# Patient Record
Sex: FEMALE
[De-identification: names, ages, dates, MRNs, and addresses within clinical notes are randomized per-mention and may not be internally consistent; named-entity substitution may affect disease eponyms.]

---

## 2019-01-10 ENCOUNTER — HOSPITAL ENCOUNTER (EMERGENCY)
Dept: HOSPITAL 42 - ED | Age: 34
Discharge: HOME | End: 2019-01-10
Payer: MEDICAID

## 2019-01-10 VITALS
OXYGEN SATURATION: 100 % | SYSTOLIC BLOOD PRESSURE: 124 MMHG | DIASTOLIC BLOOD PRESSURE: 61 MMHG | HEART RATE: 64 BPM | TEMPERATURE: 98.6 F

## 2019-01-10 VITALS — BODY MASS INDEX: 27.4 KG/M2

## 2019-01-10 VITALS — RESPIRATION RATE: 18 BRPM

## 2019-01-10 DIAGNOSIS — N30.90: Primary | ICD-10-CM

## 2019-01-10 DIAGNOSIS — R10.31: ICD-10-CM

## 2019-01-10 LAB
ALBUMIN SERPL-MCNC: 4.7 G/DL (ref 3–4.8)
ALBUMIN/GLOB SERPL: 1.4 {RATIO} (ref 1.1–1.8)
ALT SERPL-CCNC: 26 U/L (ref 7–56)
APPEARANCE UR: (no result)
APTT BLD: 28.5 SECONDS (ref 25.1–36.5)
AST SERPL-CCNC: 20 U/L (ref 14–36)
BACTERIA #/AREA URNS HPF: (no result) /HPF
BASOPHILS # BLD AUTO: 0.01 K/MM3 (ref 0–2)
BASOPHILS NFR BLD: 0.1 % (ref 0–3)
BILIRUB UR-MCNC: NEGATIVE MG/DL
BUN SERPL-MCNC: 12 MG/DL (ref 7–21)
CALCIUM SERPL-MCNC: 10.2 MG/DL (ref 8.4–10.5)
COLOR UR: YELLOW
EOSINOPHIL # BLD: 0 10*3/UL (ref 0–0.7)
EOSINOPHIL NFR BLD: 0 % (ref 1.5–5)
ERYTHROCYTE [DISTWIDTH] IN BLOOD BY AUTOMATED COUNT: 13.7 % (ref 11.5–14.5)
GFR NON-AFRICAN AMERICAN: > 60
GLUCOSE UR STRIP-MCNC: NEGATIVE MG/DL
GRANULOCYTES # BLD: 12.66 10*3/UL (ref 1.4–6.5)
GRANULOCYTES NFR BLD: 85.3 % (ref 50–68)
HGB BLD-MCNC: 14.5 G/DL (ref 12–16)
INR PPP: 1.05
LEUKOCYTE ESTERASE UR-ACNC: (no result) LEU/UL
LIPASE SERPL-CCNC: 43 U/L (ref 23–300)
LYMPHOCYTES # BLD: 1.6 10*3/UL (ref 1.2–3.4)
LYMPHOCYTES NFR BLD AUTO: 10.6 % (ref 22–35)
MCH RBC QN AUTO: 28.6 PG (ref 25–35)
MCHC RBC AUTO-ENTMCNC: 33.9 G/DL (ref 31–37)
MCV RBC AUTO: 84.4 FL (ref 80–105)
MONOCYTES # BLD AUTO: 0.6 10*3/UL (ref 0.1–0.6)
MONOCYTES NFR BLD: 4 % (ref 1–6)
PH UR STRIP: 7 [PH] (ref 4.7–8)
PLATELET # BLD: 274 10^3/UL (ref 120–450)
PMV BLD AUTO: 10.5 FL (ref 7–11)
PROT UR STRIP-MCNC: 100 MG/DL
PROTHROMBIN TIME: 12.1 SECONDS (ref 9.4–12.5)
RBC # BLD AUTO: 5.07 10^6/UL (ref 3.5–6.1)
RBC # UR STRIP: (no result) /UL
RBC #/AREA URNS HPF: (no result) /HPF (ref 0–2)
SP GR UR STRIP: >= 1.03 (ref 1–1.03)
UROBILINOGEN UR STRIP-ACNC: 0.2 E.U./DL
WBC # BLD AUTO: 14.8 10^3/UL (ref 4.5–11)
WBC #/AREA URNS HPF: (no result) /HPF (ref 0–6)

## 2019-01-10 PROCEDURE — 99283 EMERGENCY DEPT VISIT LOW MDM: CPT

## 2019-01-10 PROCEDURE — 80053 COMPREHEN METABOLIC PANEL: CPT

## 2019-01-10 PROCEDURE — 96365 THER/PROPH/DIAG IV INF INIT: CPT

## 2019-01-10 PROCEDURE — 85610 PROTHROMBIN TIME: CPT

## 2019-01-10 PROCEDURE — 83735 ASSAY OF MAGNESIUM: CPT

## 2019-01-10 PROCEDURE — 87086 URINE CULTURE/COLONY COUNT: CPT

## 2019-01-10 PROCEDURE — 81001 URINALYSIS AUTO W/SCOPE: CPT

## 2019-01-10 PROCEDURE — 85025 COMPLETE CBC W/AUTO DIFF WBC: CPT

## 2019-01-10 PROCEDURE — 96361 HYDRATE IV INFUSION ADD-ON: CPT

## 2019-01-10 PROCEDURE — 83690 ASSAY OF LIPASE: CPT

## 2019-01-10 PROCEDURE — 81025 URINE PREGNANCY TEST: CPT

## 2019-01-10 PROCEDURE — 87181 SC STD AGAR DILUTION PER AGT: CPT

## 2019-01-10 PROCEDURE — 85730 THROMBOPLASTIN TIME PARTIAL: CPT

## 2019-01-10 PROCEDURE — 96375 TX/PRO/DX INJ NEW DRUG ADDON: CPT

## 2019-01-10 PROCEDURE — 74177 CT ABD & PELVIS W/CONTRAST: CPT

## 2019-01-10 NOTE — CT
Date of service: 



01/10/2019



PROCEDURE:  CT Abdomen and Pelvis with contrast



HISTORY:

RLQ pain



COMPARISON:

None.



TECHNIQUE:

Following the intravenous administration of iodinated contrast 

material, a CT examination of the abdomen and pelvis performed from 

the domes of the diaphragms to the symphysis pubis with reformatted 

datasets provided in axial, sagittal and coronal planes. Oral 

contrast was not administered as per referring physician request. 

Coronal and sagittal reformats were generated.



Contrast dose: Omnipaque 300, 100 cc



Radiation dose:



Total exam DLP = 639.8 mGy-cm.



This CT exam was performed using one or more of the following dose 

reduction techniques: Automated exposure control, adjustment of the 

mA and/or kV according to patient size, and/or use of iterative 

reconstruction technique.



FINDINGS:



LOWER THORAX:

Unremarkable. 



LIVER:

Unremarkable. No gross lesion or ductal dilatation. 



GALLBLADDER AND BILE DUCTS:

Unremarkable. 



PANCREAS:

Unremarkable. No gross lesion or ductal dilatation.



SPLEEN:

Unremarkable. 



ADRENALS:

Unremarkable. No mass. 



KIDNEYS AND URETERS:

No obstructive uropathy is appreciate bilaterally or suspicious solid 

renal mass.  However, there is abnormal enhancement involving the 

wall of the right renal pelvis and the entire right ureter down to 

the ureter vessel junction.  Consider potential expelled calculus 

with reactive changes at the right renal pelvis and right ureter or 

possible minimal obstructing loosing calculus at the right UV 

junction.  Expelled calculus is likely given limited right 

hydroureter.  Alternatively, there may be be right ureteritis on an 

infectious or inflammatory basis and further clinical correlation is 

recommended. 



VASCULATURE:

Unremarkable. No aortic aneurysm. No aortic atherosclerotic 

calcification or mural plaque present.



BOWEL:

Unremarkable. No obstruction. No gross mural thickening. 



APPENDIX:

Normal appendix. 



PERITONEUM:

Unremarkable. No free fluid. No free air. 



LYMPH NODES:

Unremarkable. No enlarged lymph nodes. 



BLADDER:

Distended but otherwise unremarkable. 



REPRODUCTIVE:

1.6 cm right adnexal cyst with trace local adnexal fluid which may 

indicate partial rupture. 



BONES:

No acute fracture. 



OTHER FINDINGS:

None.



IMPRESSION:

1. Limited right hydroureter with abnormal mural enhancement 

throughout the right ureter also involving the right renal pelvis of 

the right renal pelvis is not distended.  No hydronephrosis.  

Consider possible expelled obstructing right ureteral calculus versus 

infectious or inflammatory etiology of mural thickening enhancement 

throughout the right ureter.  Urinary bladder appears unremarkable.  

Left kidney is negative. 



2. Negative appendix.

## 2019-01-10 NOTE — ED PDOC
Arrival/HPI





- General


Chief Complaint: Abdominal Pain


Time Seen by Provider: 01/10/19 12:07


Historian: Patient





- History of Present Illness


Narrative History of Present Illness (Text): 





01/10/19 14:42


34yo female with no pmhx who present with complaint of RLQ abdominal pain since 

last night with associated nausea. Describes pain as sharp and constant. Admits 

to urinary frequency. Denies vomiting, diarrhea, constipation, fever, chills, 

dysuria, hematemesis, melena, hematuria, back pain, sick contact, travel, any 

other complaint.











Past Medical History





- Provider Review


Nursing Documentation Reviewed: Yes





- Psychiatric


Hx Substance Use: No





- Surgical History


Other/Comment: 3 normal birth delivery





Family/Social History





- Physician Review


Nursing Documentation Reviewed: Yes


Family/Social History: Unknown Family HX


Smoking Status: Never Smoked


Hx Alcohol Use: No


Hx Substance Use: No





Allergies/Home Meds


Allergies/Adverse Reactions: 


Allergies





No Known Allergies Allergy (Verified 01/10/19 12:24)


   











Review of Systems





- Physician Review


All systems were reviewed & negative as marked: Yes





- Review of Systems


Constitutional: Normal


Eyes: Normal


ENT: Normal


Respiratory: Normal


Cardiovascular: Normal


Gastrointestinal: Normal


Genitourinary Female: Normal


Musculoskeletal: Normal


Skin: Normal


Neurological: Normal


Endocrine: Normal


Hemo/Lymphatic: Normal


Psychiatric: Normal





Physical Exam


Vital Signs Reviewed: Yes





Vital Signs











  Temp Pulse Resp BP Pulse Ox


 


 01/10/19 12:31  98.7 F  70  18  135/84  99











Temperature: Afebrile


Blood Pressure: Normal


Pulse: Regular


Respiratory Rate: Normal


Appearance: Positive for: Well-Appearing, Non-Toxic, Comfortable


Pain Distress: None


Mental Status: Positive for: Alert and Oriented X 3





- Systems Exam


Head: Present: Atraumatic, Normocephalic


Pupils: Present: PERRL


Extroacular Muscles: Present: EOMI


Conjunctiva: Present: Normal


Mouth: Present: Moist Mucous Membranes


Neck: Present: Normal Range of Motion


Respiratory/Chest: Present: Clear to Auscultation, Good Air Exchange.  No: 

Respiratory Distress, Accessory Muscle Use


Cardiovascular: Present: Regular Rate and Rhythm, Normal S1, S2.  No: Murmurs


Abdomen: Present: Tenderness, Normal Bowel Sounds, Guarding (Voluntary), Other 

(Soft).  No: Distention, Peritoneal Signs, Rebound, McBurney's Point Tender, 

Rovsing's Sign Present


Back: Present: Normal Inspection


Upper Extremity: Present: Normal Inspection.  No: Cyanosis, Edema


Lower Extremity: Present: Normal Inspection.  No: Edema


Neurological: Present: GCS=15, CN II-XII Intact, Speech Normal


Skin: Present: Warm, Dry, Normal Color.  No: Rashes


Psychiatric: Present: Alert, Oriented x 3, Normal Insight, Normal Concentration





Medical Decision Making


ED Course and Treatment: 





01/10/19 19:31


PT presented to ED for stated history. she was afebrile and hemodynamically 

stable.





Labs was reviewed and leukocytosis and cystitis was noted





Abdominal/pelvic Ct was ordered to r/o appendicitis








Abdominal/pelvic CT


IMPRESSION:


1. Limited right hydroureter with abnormal mural enhancement throughout the 

right ureter also involving the right renal pelvis of the right renal pelvis is 

not distended.  No hydronephrosis.  Consider possible expelled obstructing right

ureteral calculus versus infectious or inflammatory etiology of mural thickening

enhancement throughout the right ureter.  Urinary bladder appears unremarkable. 

Left kidney is negative. 





2. Negative appendix.





PT was treated in ED with Rocephin and DC home with keflex





Result was DW the pt and she was advised to f/u with her PMD


TRT ED for worsening symptoms





- Lab Interpretations


Lab Results: 





                                        











PT  12.1 SECONDS (9.4-12.5)   01/10/19  12:48    


 


INR  1.05   01/10/19  12:48    


 


APTT  28.5 Seconds (25.1-36.5)   01/10/19  12:48    








                                        











Total Bilirubin  0.6 mg/dL (0.2-1.3)   01/10/19  12:48    


 


AST  20 U/L (14-36)   01/10/19  12:48    


 


ALT  26 U/L (7-56)   01/10/19  12:48    


 


Alkaline Phosphatase  96 U/L ()   01/10/19  12:48    


 


Total Protein  8.1 g/dL (5.8-8.3)   01/10/19  12:48    


 


Albumin  4.7 g/dL (3.0-4.8)   01/10/19  12:48    


 


Globulin  3.4 gm/dL  01/10/19  12:48    


 


Albumin/Globulin Ratio  1.4  (1.1-1.8)   01/10/19  12:48    








                                        











Lipase  43 U/L ()   01/10/19  12:48    








                                        











Urine Color  Yellow  (YELLOW)   01/10/19  12:28    


 


Urine Appearance  Sl cloudy  (CLEAR)   01/10/19  12:28    


 


Urine pH  7.0  (4.7-8.0)   01/10/19  12:28    


 


Ur Specific Gravity  >= 1.030  (1.005-1.035)   01/10/19  12:28    


 


Urine Protein  100 mg/dL (<30 mg/dL)  H  01/10/19  12:28    


 


Urine Glucose (UA)  Negative mg/dL (NEGATIVE)   01/10/19  12:28    


 


Urine Ketones  Negative mg/dL (NEGATIVE)   01/10/19  12:28    


 


Urine Blood  Moderate  (NEGATIVE)  H  01/10/19  12:28    


 


Urine Nitrate  Positive  (NEGATIVE)  H  01/10/19  12:28    


 


Urine Bilirubin  Negative  (NEGATIVE)   01/10/19  12:28    


 


Urine Urobilinogen  0.2 E.U./dL (<1 E.U./dL)   01/10/19  12:28    


 


Ur Leukocyte Esterase  Small Daisha/uL (NEGATIVE)  H  01/10/19  12:28    


 


Urine RBC  15 - 20 /hpf (0-2)  H  01/10/19  12:28    


 


Urine WBC  15 - 20 /hpf (0-6)  H  01/10/19  12:28    


 


Ur Epithelial Cells  10 - 12 /hpf (0-5)  H  01/10/19  12:28    


 


Urine Bacteria  Mod /hpf (NONE)   01/10/19  12:28    














- RAD Interpretation


Radiology Orders: 











01/10/19 13:24


ABD & PELVIS IV CONTRAST ONLY [CT] Stat 














- Medication Orders


Current Medication Orders: 











Ceftriaxone Sodium (Rocephin 1 Gram Ivpb)  1 gm in 100 mls @ 200 mls/hr IVPB 

STAT STA; Protocol


   Stop: 01/10/19 15:08





Discontinued Medications





Famotidine (Pepcid)  20 mg IVP STAT STA


   Stop: 01/10/19 12:29


   Last Admin: 01/10/19 12:59  Dose: 20 mg





IVP Administration


 Document     01/10/19 12:59  LA  (Rec: 01/10/19 12:59  LA  [id number])


     Charges for Administration


      # of IVP Administrations                   1





Sodium Chloride (Sodium Chloride 0.9%)  1,000 mls @ 1,000 mls/hr IV .Q1H STA


   Stop: 01/10/19 13:27


   Last Admin: 01/10/19 12:49  Dose: 1,000 mls/hr





eMAR Start Stop


 Document     01/10/19 12:49  LA  (Rec: 01/10/19 12:59  LA  [id number])


     Intravenous Solution


      Start Date                                 01/10/19


      Start Time                                 12:59


      End Date                                   01/10/19


      End time                                   13:59


      Total Infusion Time                        60





Ondansetron HCl (Zofran Inj)  4 mg IVP STAT STA


   Stop: 01/10/19 12:29


   Last Admin: 01/10/19 12:59  Dose: 4 mg





IVP Administration


 Document     01/10/19 12:59  LA  (Rec: 01/10/19 12:59  LA  [id number])


     Charges for Administration


      # of IVP Administrations                   1














Disposition/Present on Arrival





- Present on Arrival


Any Indicators Present on Arrival: No


History of DVT/PE: No


History of Uncontrolled Diabetes: No


Urinary Catheter: No


History of Decub. Ulcer: No


History Surgical Site Infection Following: None





- Disposition


Have Diagnosis and Disposition been Completed?: Yes


Diagnosis: 


 Cystitis, Abdominal pain





Disposition: HOME/ ROUTINE


Disposition Time: 15:00


Patient Plan: Discharge


Condition: STABLE


Discharge Instructions (ExitCare):  Acute Cystitis (DC)


Additional Instructions: 


Follow up with your Doctor


Return to ED for any new or worsening symptoms


Prescriptions: 


Cephalexin [Keflex] 500 mg PO TID #21 capsule


Referrals: 


Kerry Monique MD [Medical Doctor] - Follow up with primary


Forms:  [employer] (English)